# Patient Record
Sex: MALE | Race: WHITE
[De-identification: names, ages, dates, MRNs, and addresses within clinical notes are randomized per-mention and may not be internally consistent; named-entity substitution may affect disease eponyms.]

---

## 2018-04-03 ENCOUNTER — HOSPITAL ENCOUNTER (EMERGENCY)
Dept: HOSPITAL 75 - ER | Age: 48
Discharge: HOME | End: 2018-04-03
Payer: COMMERCIAL

## 2018-04-03 VITALS — DIASTOLIC BLOOD PRESSURE: 100 MMHG | SYSTOLIC BLOOD PRESSURE: 162 MMHG

## 2018-04-03 VITALS — BODY MASS INDEX: 36.57 KG/M2 | HEIGHT: 72 IN | WEIGHT: 270 LBS

## 2018-04-03 DIAGNOSIS — Z88.0: ICD-10-CM

## 2018-04-03 DIAGNOSIS — I88.0: Primary | ICD-10-CM

## 2018-04-03 DIAGNOSIS — F17.200: ICD-10-CM

## 2018-04-03 LAB
ALBUMIN SERPL-MCNC: 4.3 GM/DL (ref 3.2–4.5)
ALP SERPL-CCNC: 68 U/L (ref 40–136)
ALT SERPL-CCNC: 44 U/L (ref 0–55)
APTT PPP: YELLOW S
BACTERIA #/AREA URNS HPF: NEGATIVE /HPF
BASOPHILS # BLD AUTO: 0 10^3/UL (ref 0–0.1)
BASOPHILS NFR BLD AUTO: 0 % (ref 0–10)
BILIRUB SERPL-MCNC: 0.7 MG/DL (ref 0.1–1)
BILIRUB UR QL STRIP: NEGATIVE
BUN/CREAT SERPL: 12
CALCIUM SERPL-MCNC: 9.9 MG/DL (ref 8.5–10.1)
CHLORIDE SERPL-SCNC: 102 MMOL/L (ref 98–107)
CO2 SERPL-SCNC: 20 MMOL/L (ref 21–32)
CREAT SERPL-MCNC: 1.05 MG/DL (ref 0.6–1.3)
EOSINOPHIL # BLD AUTO: 0.7 10^3/UL (ref 0–0.3)
EOSINOPHIL NFR BLD AUTO: 8 % (ref 0–10)
ERYTHROCYTE [DISTWIDTH] IN BLOOD BY AUTOMATED COUNT: 13.1 % (ref 10–14.5)
FIBRINOGEN PPP-MCNC: CLEAR MG/DL
GFR SERPLBLD BASED ON 1.73 SQ M-ARVRAT: > 60 ML/MIN
GLUCOSE SERPL-MCNC: 306 MG/DL (ref 70–105)
GLUCOSE UR STRIP-MCNC: (no result) MG/DL
GRAN CASTS #/AREA URNS LPF: (no result) /LPF
HCT VFR BLD CALC: 45 % (ref 40–54)
HGB BLD-MCNC: 16.3 G/DL (ref 13.3–17.7)
KETONES UR QL STRIP: NEGATIVE
LEUKOCYTE ESTERASE UR QL STRIP: NEGATIVE
LYMPHOCYTES # BLD AUTO: 2 X 10^3 (ref 1–4)
LYMPHOCYTES NFR BLD AUTO: 22 % (ref 12–44)
MANUAL DIFFERENTIAL PERFORMED BLD QL: NO
MCH RBC QN AUTO: 31 PG (ref 25–34)
MCHC RBC AUTO-ENTMCNC: 37 G/DL (ref 32–36)
MCV RBC AUTO: 83 FL (ref 80–99)
MONOCYTES # BLD AUTO: 0.5 X 10^3 (ref 0–1)
MONOCYTES NFR BLD AUTO: 5 % (ref 0–12)
NEUTROPHILS # BLD AUTO: 5.7 X 10^3 (ref 1.8–7.8)
NEUTROPHILS NFR BLD AUTO: 64 % (ref 42–75)
NITRITE UR QL STRIP: NEGATIVE
PH UR STRIP: 5 [PH] (ref 5–9)
PLATELET # BLD: 244 10^3/UL (ref 130–400)
PMV BLD AUTO: 9.5 FL (ref 7.4–10.4)
POTASSIUM SERPL-SCNC: 4.4 MMOL/L (ref 3.6–5)
PROT SERPL-MCNC: 7.8 GM/DL (ref 6.4–8.2)
PROT UR QL STRIP: (no result)
RBC # BLD AUTO: 5.35 10^6/UL (ref 4.35–5.85)
RBC #/AREA URNS HPF: (no result) /HPF
SODIUM SERPL-SCNC: 135 MMOL/L (ref 135–145)
SP GR UR STRIP: 1.02 (ref 1.02–1.02)
SQUAMOUS #/AREA URNS HPF: (no result) /HPF
UROBILINOGEN UR-MCNC: NORMAL MG/DL
WBC # BLD AUTO: 8.9 10^3/UL (ref 4.3–11)
WBC #/AREA URNS HPF: (no result) /HPF

## 2018-04-03 PROCEDURE — 36415 COLL VENOUS BLD VENIPUNCTURE: CPT

## 2018-04-03 PROCEDURE — 85025 COMPLETE CBC W/AUTO DIFF WBC: CPT

## 2018-04-03 PROCEDURE — 81000 URINALYSIS NONAUTO W/SCOPE: CPT

## 2018-04-03 PROCEDURE — 96374 THER/PROPH/DIAG INJ IV PUSH: CPT

## 2018-04-03 PROCEDURE — 96361 HYDRATE IV INFUSION ADD-ON: CPT

## 2018-04-03 PROCEDURE — 74177 CT ABD & PELVIS W/CONTRAST: CPT

## 2018-04-03 PROCEDURE — 80053 COMPREHEN METABOLIC PANEL: CPT

## 2018-04-03 PROCEDURE — 86141 C-REACTIVE PROTEIN HS: CPT

## 2018-04-03 NOTE — XMS REPORT
Continuity of Care Document

 Created on: 2018



NIGEL LAMBERT

External Reference #: [id number]

: 1970

Sex: Male



Demographics







 Preferred Language  Unknown

 

 Marital Status  Unknown

 

 Mor[employer] Affiliation  Unknown

 

 Race  Unknown

 

 Ethnic Group  Unknown





Author







 Author  [facility]

 

 Address  Unknown

 

 Phone  Unavailable



              



Allergies

      





 Active            Description            Code            Type            
Severity            Reaction            Onset            Reported/Identified   
         Relationship to Patient            Clinical Status        

 

 Yes            Penicillins                         Drug Allergy            N/A
            N/A                         2011                             
     



                  



Medications

      



There is no data.                  



Problems

      





 Date Dx Coded            Attending            Type            Code            
Diagnosis            Diagnosed By        

 

 2011                                      465.9            UPPER 
RESPIRATORY INFECTION                     

 

 2013                                      786.2            COUGH        
             



                    



Procedures

      



There is no data.                  



Results

      



There is no data.              



Encounters

      





 ACCT No.            Visit Date/Time            Discharge            Status    
        Pt. Type            Provider            Facility            Loc./Unit  
          Complaint        

 

 [id number]            2013 15:45:00                                      
Document Registration

## 2018-04-03 NOTE — XMS REPORT
[facility]

 Created on: 10/25/2016



Reuben Parekh

External Reference #: [id number]

: 1970

Sex: Male



Demographics







 Address  [address] [place]  [address]

 

 Home Phone  [phone]

 

 Preferred Language  Unknown

 

 Marital Status  Unknown

 

 Jehovah's witness Affiliation  Unknown

 

 Race  White

 

 Ethnic Group  Not  or 





Author







 YUMIKO Levine

 

 Organization  [employer]

 

 Address  Unknown

 

 Phone  Unavailable







Care Team Providers







 Care Team Member Name  Role  Phone

 

 YUMIKO CONRAD  CP  Unavailable



                                                                



Allergies, Adverse Reactions, Alerts

          





 Substance  Reaction  Event Type

 

 Penicillin G Sodium  anaphylaxis  Drug Allergy



                                                                               
         



Problems

          





 Problem Type  Condition  Code  Onset Dates  Condition Status

 

 Problem  Cough  786.2     Active

 

 Assessment  Essential hypertension  I10     Active

 

 Problem  Essential hypertension  I10     Active



                                                                               
                             



Medications

          





 Medication  Code System  Code  Instructions  Start Date  End Date  Status  
Dosage

 

 Clonidine HCl  NDC  [address]-10  0.2 MG Orally twice daily  2016   
     1 tablet

 

 Azithromycin  NDC  [address]-91  250 MG Orally Once a day       2 tablets  on the first day, then 1 tablet daily for 4 days

 

 Lisinopril-Hydrochlorothiazide  NDC  [address]-32  20-12.5 MG Orally Once a 
day  2016        1 tablet



                                                                               
                             



Procedures

          





 Procedure  Coding System  Code  Date

 

 Office Visit, Est Pt., Level 4  CPT-4  47516  2016



                                                                               
                   



Vital Signs

          





 Date/Time:  2016

 

 Cardiac Monitoring Heart Rate  76 bpm

 

 Weight  286.4 lbs

 

 Height  72 in

 

 BMI  38.84 Index

 

 Blood Pressure Diastolic  118 mmHg

 

 Blood Pressure Systolic  170 mmHg



                                                                              



Results

          No Known Results                                                     
               



Summary Purpose

          [employer] Submission

## 2018-04-03 NOTE — XMS REPORT
[facility]

 Created on: 2016



Reuben Parekh

External Reference #: [id number]

: 1970

Sex: Male



Demographics







 Address  [address] [place]  [address]

 

 Home Phone  [phone]

 

 Preferred Language  Unknown

 

 Marital Status  Unknown

 

 Church Affiliation  Unknown

 

 Race  White

 

 Ethnic Group  Not  or 





Author







 Author  BRUCE TRACY

 

 Organization  [employer]

 

 Address  Unknown

 

 Phone  Unavailable







Care Team Providers







 Care Team Member Name  Role  Phone

 

 BRUCE TRACY  CP  Unavailable



                                                                



Allergies

          No Known Allergies                                                   
                                     



Problems

          





 Problem Type  Condition  Code  Onset Dates  Condition Status

 

 Problem  Essential hypertension  I10     Active

 

 Problem  Cough  786.2     Active

 

 Problem  Hypertension, benign  I10     Active



                                                                               
                             



Medications

          





 Medication  Code System  Code  Instructions  Start Date  End Date  Status  
Dosage

 

 Clonidine HCl  NDC  [address]-10  0.2 MG Orally twice daily  2016   
     1 tablet

 

 Lisinopril-Hydrochlorothiazide  NDC  [address]-60  20-25 MG Orally Once a day
  2016        1 tablet



                                                                               
         



Results

          No Known Results                                                     
               



Summary Purpose

          [employer] Submission

## 2018-04-03 NOTE — XMS REPORT
[facility]

 Created on: 10/12/2016



Sun ValleyReuben

External Reference #: [id number]

: 1970

Sex: Male



Demographics







 Address  [address] [place]  [address]

 

 Home Phone  [phone]

 

 Preferred Language  Unknown

 

 Marital Status  Unknown

 

 Catholic Affiliation  Unknown

 

 Race  White

 

 Ethnic Group  Not  or 





Author







 Author  BRUCE TRACY

 

 Organization  [employer]

 

 Address  Unknown

 

 Phone  Unavailable







Care Team Providers







 Care Team Member Name  Role  Phone

 

 BRUCE TRACY  CP  Unavailable



                                                                



Allergies, Adverse Reactions, Alerts

          





 Substance  Reaction  Event Type

 

 Penicillin V Potassium  Info Not Available  Drug Allergy



                                                                               
         



Problems

          





 Problem Type  Condition  Code  Onset Dates  Condition Status

 

 Problem  Cough  786.2     Active

 

 Assessment  Hypertension, benign  I10     Active

 

 Problem  Hypertension, benign  I10     Active



                                                                               
                             



Medications

          





 Medication  Code System  Code  Instructions  Start Date  End Date  Status  
Dosage

 

 Lisinopril-Hydrochlorothiazide  NDC  [address]-11  20-25 MG Orally Once a day
  2016        1 tablet

 

 Clonidine HCl  NDC  [address]-10  0.2 MG Orally twice daily  2016   
     1 tablet



                                                                               
                   



Procedures

          





 Procedure  Coding System  Code  Date

 

 Office Visit, Est Pt., Level 3  CPT-4  86791  Oct 06, 2016



                                                                               
                   



Vital Signs

          





 Date/Time:  Oct 06, 2016

 

 Cardiac Monitoring Heart Rate  82 bpm

 

 Weight  282.6 lbs

 

 Height  72 in

 

 BMI  38.32 Index

 

 Blood Pressure Diastolic  106 mmHg

 

 Blood Pressure Systolic  160 mmHg



                                                                              



Results

          No Known Results                                                     
               



Summary Purpose

          [employer] Submission

## 2018-04-03 NOTE — DIAGNOSTIC IMAGING REPORT
PROCEDURE: CT abdomen and pelvis with contrast.



TECHNIQUE: Multiple contiguous axial images were obtained through

the abdomen and pelvis after administration of intravenous

contrast. 



INDICATION: Pain.



COMPARISON: 04/27/2011.



FINDINGS: There is a calcified granuloma at the left lung base.

The lung bases are otherwise clear. There is fairly prominent

diffuse hepatic steatosis. No focal hepatic mass is seen. The

portal vein enhances normally. The gallbladder is absent. There

is no biliary dilatation. The pancreas appears unremarkable.

There are scattered calcified granulomas in the spleen which is

otherwise unremarkable. The adrenal glands appear normal. The

kidneys, ureters and bladder appear unremarkable. No obstructive

change is seen. The appendix appears normal. There is no focal

inflammatory process or evidence of bowel obstruction. There is

some hazy stranding with mild prominent lymph nodes seen in the

central mesentery possibly related to mesenteric panniculitis. No

abnormally enlarged lymph nodes are seen. There is no free fluid

or free air. There is a small fat-containing umbilical hernia.

The abdominal aorta appears normal in caliber with mild

atherosclerosis noted. There are mild degenerative changes in the

spine.



IMPRESSION:

1. There is mild hazy soft tissue stranding in the central

mesentery with a few prominent lymph nodes through this region.

This can be seen with mesenteric panniculitis.

2. Diffuse hepatic steatosis.

3. Small fat-containing umbilical hernia.

4. No acute abnormality is suspected in the abdomen and pelvis.



Dictated by: 



  Dictated on workstation # [id number]

## 2018-04-03 NOTE — ED ABDOMINAL PAIN
General


Chief Complaint:  Abdominal/GI Problems


Stated Complaint:  BACK PAIN RIGHT SIDE PAIN


Nursing Triage Note:  


c/o right lower abd pain x 1.5 weeks. Pain increased to right flank this 


morning. Denies urinary symptoms/fever/chills.


Sepsis Screen:  No Definite Risk


Source of Information:  Patient


Exam Limitations:  No Limitations





History of Present Illness


Date Seen by Provider:  Apr 3, 2018


Time Seen by Provider:  09:30


Initial Comments


Here with report of right flank pain over the last week and a half.  Started in 

the right lower quadrant and has moved to the right flank.  Denies diarrhea or 

dysuria.  Has had some vomiting but no blood in the vomit, stool or urine.  

Denies fever and chills.  Denies weakness.  Blood pressure is elevated and has 

history of elevated blood pressure.


Timing/Duration:  1 Week


Severity/Quality:  Moderate


Location:  RLQ


Radiation:  Flank


Activities at Onset:  None


Modifying Factors:  Worsens With Movement, Improves With Resting


Associated Symptoms:  Back Pain, No Chest Pain, No Fever/Chills, No Nausea/

Vomiting, No Shortness of Air, No Weakness





Allergies and Home Medications


Allergies


Coded Allergies:  


     Penicillins (Unverified  Allergy, Mild, 2/3/10)





Home Medications


Lisinopril/Hydrochlorothiazide 1 Each Tablet, 1 EACH PO DAILY, (Reported)





Patient Home Medication List


Home Medication List Reviewed:  Yes





Review of Systems


Constitutional:  see HPI, No chills, No fever


EENTM:  No Symptoms Reported


Respiratory:  No Symptoms Reported


Cardiovascular:  No Symptoms Reported


Gastrointestinal:  See HPI, Abdominal Pain, Denies Nausea, Denies Vomiting


Genitourinary:  No Symptoms Reported


Musculoskeletal:  back pain, No neck pain


Skin:  no symptoms reported


Psychiatric/Neurological:  No Symptoms Reported





All Other Systems Reviewed


Negative Unless Noted:  Yes





Past Medical-Social-Family Hx


Patient Social History


Alcohol Use:  Occasionally Uses


Alcohol Beverage of Choice:  Beer


Recreational Drug Use:  No


Smoking Status:  Current Everyday Smoker


Recent Foreign Travel:  No


Contact w/Someone Who Travel:  No


Recent Infectious Disease Expo:  No


Recent Hopitalizations:  No





Surgeries


History of Surgeries:  Yes (gallbladder, knee )





Respiratory


History of Respiratory Disorde:  No





Cardiovascular


History of Cardiac Disorders:  Yes





Neurological


History of Neurological Disord:  Yes





Gastrointestinal


History of Gastrointestinal Di:  No





Endocrine


History of Endocrine Disorders:  No





Psychosocial


History of Psychiatric Problem:  No





Blood Transfusions


History of Blood Disorders:  No





Reviewed Nursing Assessment


Reviewed/Agree w Nursing PMH:  Yes





Family Medical History


Significant Family History:  No Pertinent Family Hx





Physical Exam


Vital Signs





 VS - Last 72 Hours, by Label








 4/3/18





 08:54


 


Temp 97.4


 


Pulse 101


 


Resp 20


 


B/P (MAP) 172/139 (150)


 


Pulse Ox 98





Capillary Refill : Less Than 3 Seconds


General Appearance:  WD/WN, no apparent distress


HEENT:  PERRL/EOMI, pharynx normal


Neck:  full range of motion, supple


Respiratory:  lungs clear, normal breath sounds


Cardiovascular:  regular rate, rhythm, no murmur


Gastrointestinal:  normal bowel sounds, non tender, soft, no organomegaly, no 

pulsatile mass


Extremities:  non-tender, normal inspection


Back:  no vertebral tenderness, CVA tenderness (R), No CVA tenderness (L)


Neurologic/Psychiatric:  alert, oriented x 3


Skin:  normal color, warm/dry





Progress/Results/Core Measures


Results/Orders


Lab Results





Laboratory Tests








Test


  4/3/18


09:00 4/3/18


09:30 Range/Units


 


 


White Blood Count


  8.9 


  


  4.3-11.0


10^3/uL


 


Red Blood Count


  5.35 


  


  4.35-5.85


10^6/uL


 


Hemoglobin 16.3   13.3-17.7  G/DL


 


Hematocrit 45   40-54  %


 


Mean Corpuscular Volume 83   80-99  FL


 


Mean Corpuscular Hemoglobin 31   25-34  PG


 


Mean Corpuscular Hemoglobin


Concent 37 H


  


  32-36  G/DL


 


 


Red Cell Distribution Width 13.1   10.0-14.5  %


 


Platelet Count


  244 


  


  130-400


10^3/uL


 


Mean Platelet Volume 9.5   7.4-10.4  FL


 


Neutrophils (%) (Auto) 64   42-75  %


 


Lymphocytes (%) (Auto) 22   12-44  %


 


Monocytes (%) (Auto) 5   0-12  %


 


Eosinophils (%) (Auto) 8   0-10  %


 


Basophils (%) (Auto) 0   0-10  %


 


Neutrophils # (Auto) 5.7   1.8-7.8  X 10^3


 


Lymphocytes # (Auto) 2.0   1.0-4.0  X 10^3


 


Monocytes # (Auto) 0.5   0.0-1.0  X 10^3


 


Eosinophils # (Auto)


  0.7 H


  


  0.0-0.3


10^3/uL


 


Basophils # (Auto)


  0.0 


  


  0.0-0.1


10^3/uL


 


Sodium Level 135   135-145  MMOL/L


 


Potassium Level 4.4   3.6-5.0  MMOL/L


 


Chloride Level 102     MMOL/L


 


Carbon Dioxide Level 20 L  21-32  MMOL/L


 


Anion Gap 13   5-14  MMOL/L


 


Blood Urea Nitrogen 13   7-18  MG/DL


 


Creatinine


  1.05 


  


  0.60-1.30


MG/DL


 


Estimat Glomerular Filtration


Rate > 60 


  


   


 


 


BUN/Creatinine Ratio 12    


 


Glucose Level 306 H    MG/DL


 


Calcium Level 9.9   8.5-10.1  MG/DL


 


Total Bilirubin 0.7   0.1-1.0  MG/DL


 


Aspartate Amino Transf


(AST/SGOT) 21 


  


  5-34  U/L


 


 


Alanine Aminotransferase


(ALT/SGPT) 44 


  


  0-55  U/L


 


 


Alkaline Phosphatase 68     U/L


 


C-Reactive Protein High


Sensitivity 1.28 H


  


  0.00-0.50


MG/DL


 


Total Protein 7.8   6.4-8.2  GM/DL


 


Albumin 4.3   3.2-4.5  GM/DL


 


Urine Color  YELLOW   


 


Urine Clarity  CLEAR   


 


Urine pH  5  5-9  


 


Urine Specific Gravity  1.020  1.016-1.022  


 


Urine Protein  1+ H NEGATIVE  


 


Urine Glucose (UA)  4+ H NEGATIVE  


 


Urine Ketones  NEGATIVE  NEGATIVE  


 


Urine Nitrite  NEGATIVE  NEGATIVE  


 


Urine Bilirubin  NEGATIVE  NEGATIVE  


 


Urine Urobilinogen  NORMAL  NORMAL  MG/DL


 


Urine Leukocyte Esterase  NEGATIVE  NEGATIVE  


 


Urine RBC (Auto)  1+ H NEGATIVE  


 


Urine RBC  RARE   /HPF


 


Urine WBC  NONE   /HPF


 


Urine Squamous Epithelial


Cells 


  RARE 


   /HPF


 


 


Urine Crystals  NONE   /LPF


 


Urine Bacteria  NEGATIVE   /HPF


 


Urine Casts  PRESENT   /LPF


 


Urine Granular Casts  RARE   /LPF


 


Urine Mucus  NEGATIVE   /LPF


 


Urine Culture Indicated  NO   








My Orders





Orders - BENEDICTO REBOLLEDO MD


Cbc With Automated Diff (4/3/18 08:51)


Comprehensive Metabolic Panel (4/3/18 08:51)


Hs C Reactive Protein (4/3/18 08:51)


Ua Culture If Indicated (4/3/18 08:51)


Saline Lock/Iv-Start (4/3/18 08:51)


Ns Iv 1000 Ml (Sodium Chloride 0.9%) (4/3/18 08:51)


Ct Abdomen/Pelvis W (4/3/18 09:58)


Iohexol Injection (Omnipaque 350 Mg/Ml 1 (4/3/18 10:15)


Ns (Ivpb) (Sodium Chloride 0.9%) (4/3/18 10:15)


Metoprolol Succinate (Xl) Tab (Toprol Xl (4/3/18 11:45)





Medications Given in ED





Current Medications








 Medications  Dose


 Ordered  Sig/Jimmie


 Route  Start Time


 Stop Time Status Last Admin


Dose Admin


 


 Iohexol  100 ml  ONCE  ONCE


 IV  4/3/18 10:15


 4/3/18 10:16 DC 4/3/18 10:29


100 ML


 


 Sodium Chloride  250 ml  ONCE  ONCE


 IV  4/3/18 10:15


 4/3/18 10:16 DC 4/3/18 10:29


80 ML


 


 Sodium Chloride  1,000 ml @ 


 0 mls/hr  Q0M ONCE


 IV  4/3/18 08:51


 4/3/18 08:53 DC 4/3/18 09:42


1,000 MLS/HR








Vital Signs/I&O





Vital Sign - Last 12Hours








 4/3/18





 08:54


 


Temp 97.4


 


Pulse 101


 


Resp 20


 


B/P (MAP) 172/139 (150)


 


Pulse Ox 98














Blood Pressure Mean:  150








Progress Note :  


Progress Note


Seen and evaluated.  IV, labs, UA, normal saline 1 L bolus ordered.  CT abdomen 

and pelvis ordered with contrast.  Patient declined pain medicine.  Monitor 

patient.  1130: Labs and CT reviewed.  Patient appears to have diabetes with 

blood sugar just over 300.  Toprol XL 50 mg by mouth given for hypertension.  

Patient has asked about referral to local physician as he typically follows in 

the clinic but would like to have a more stable doctor.  I will attempt to 

contact Dr. Maninder Conrad about options for his clinic.  He understands 

importance of following up for further evaluation and initiation of treatment 

for his diabetes.  Discharged home with return precautions.  Patient and family 

verbalize understanding instructions and agreement with plan.  1140: I 

discussed the case with Dr. Conrad and he will see the patient in the clinic 

tomorrow at 4 p.m.  He will initiate prescriptions as needed.  Toradol 30 mg IV 

for the low back pain given now.  Discharged home with return precautions.  

Patient and family verbalize understanding instructions and agreement with plan.





Diagnostic Imaging





   Diagonstic Imaging:  CT


   Plain Films/CT/US/NM/MRI:  abdomen, pelvis


Comments


 [facility].


 [place], [place]





NAME:   NIGEL LAMBERT


MED REC#:   [id number]


ACCOUNT#:   [id number]


PT STATUS:   REG ER


:   1970


PHYSICIAN:   BENEDICTO REBOLLEDO MD


ADMIT DATE:   18/ER


 ***Draft***


Date of Exam:18





CT ABDOMEN/PELVIS W








PROCEDURE: CT abdomen and pelvis with contrast.





TECHNIQUE: Multiple contiguous axial images were obtained through


the abdomen and pelvis after administration of intravenous


contrast. 





INDICATION: Pain.





COMPARISON: 2011.





FINDINGS: There is a calcified granuloma at the left lung base.


The lung bases are otherwise clear. There is fairly prominent


diffuse hepatic steatosis. No focal hepatic mass is seen. The


portal vein enhances normally. The gallbladder is absent. There


is no biliary dilatation. The pancreas appears unremarkable.


There are scattered calcified granulomas in the spleen which is


otherwise unremarkable. The adrenal glands appear normal. The


kidneys, ureters and bladder appear unremarkable. No obstructive


change is seen. The appendix appears normal. There is no focal


inflammatory process or evidence of bowel obstruction. There is


some hazy stranding with mild prominent lymph nodes seen in the


central mesentery possibly related to mesenteric panniculitis. No


abnormally enlarged lymph nodes are seen. There is no free fluid


or free air. There is a small fat-containing umbilical hernia.


The abdominal aorta appears normal in caliber with mild


atherosclerosis noted. There are mild degenerative changes in the


spine.





IMPRESSION:


1. There is mild hazy soft tissue stranding in the central


mesentery with a few prominent lymph nodes through this region.


This can be seen with mesenteric panniculitis.


2. Diffuse hepatic steatosis.


3. Small fat-containing umbilical hernia.


4. No acute abnormality is suspected in the abdomen and pelvis.





  Dictated on workstation # [id number]








Dict:   18 1043


Trans:   18 1052


[facility] [id number]





Interpreted by:     AINSLEY DONIS DO


Electronically signed by:





Departure


Impression


Impression:  


 Primary Impression:  


 Right flank pain


 Additional Impression:  


 Mesenteric adenitis


Disposition:  01 HOME, SELF-CARE


Condition:  Improved





Departure-Patient Inst.


Decision time for Depature:  11:52


Referrals:  


NO,LOCAL PHYSICIAN (PCP)


Primary Care Physician








MANINDER CONRAD MD


Patient Instructions:  Acute Abdomen (Belly Pain), Adult (DC), Low Back Pain  (

DC)





Add. Discharge Instructions:  


All discharge instructions reviewed with patient and/or family. Voiced 

understanding.





You may take ibuprofen 600 mg every 8 hours as needed for pain.  You may take 

Tylenol/acetaminophen 1000 mg every 8 hours as needed for pain.  Drink plenty 

of fluids.  You should avoid sugars in your diet as her blood sugar is 

elevated.  Follow-up with Dr. Maninder Conrad tomorrow at his office at 4 p.m.  

Return for worse pain, fever, vomiting, weakness, breathing problems or other 

concerns as needed.





Copy


Copies To 1:   MANINDER CONRAD MD, TIMOTHY D MD Apr 3, 2018 10:04

## 2018-04-03 NOTE — XMS REPORT
[facility]

 Created on: 2017



Reuben Parekh

External Reference #: [id number]

: 1970

Sex: Male



Demographics







 Address  [address] [place]  [address]

 

 Preferred Language  Unknown

 

 Marital Status  Unknown

 

 Restorationism Affiliation  Unknown

 

 Race  Unknown

 

 Ethnic Group  Unknown





Author







 Author  TRACE COLINDRES

 

 [facility]

 

 Address  [address] [place]  [address]



 

 Phone  [phone]







Care Team Providers







 Care Team Member Name  Role  Phone

 

 EREN COLINDRESISTIN  Unavailable  [phone]







PROBLEMS







 Type  Condition  ICD9-CM Code  ZBH28-DK Code  Onset Dates  Condition Status  
SNOMED Code

 

 Problem  Cough  786.2        Active  18719593

 

 Assessment  Bronchitis     J40  16 Sep, 2016  Active  [id number]

 

 Assessment  Hypertensive crisis     I10  16 Sep, 2016  Active  [id number]

 

 Assessment  Essential hypertension     I10  16 Sep, 2016  Active  [id number]







ALLERGIES







 Substance  Reaction  Event Type  Date  Status

 

 Penicillin G Sodium  anaphylaxis  Drug Allergy  16 Sep, 2016  Active







SOCIAL HISTORY

No smoking Hx information available



PLAN OF CARE





VITAL SIGNS







 Height  72 in  2016

 

 Weight  284.4 lbs  2016

 

 Heart Rate  92 bpm  2016

 

 Respiratory Rate  22   2016

 

 Oximetry  96 %  2016

 

 BMI  38.57 kg/m2  2016

 

 Blood pressure systolic  238 mmHg  2016

 

 Blood pressure diastolic  142 mmHg  2016







MEDICATIONS







 Medication  Instructions  Dosage  Frequency  Start Date  End Date  Duration  
Status

 

 Azithromycin 250 MG  Orally Once a day  2 tablets  on the first day, then 1 
tablet daily for 4 days  24h  16 Sep, 2016  21 Sep, 2016  5 day(s)  Active

 

 Clonidine HCl 0.2 MG  Orally twice daily  1 tablet     16 Sep, 2016     30 day(
s)  Active

 

 NyQuil                    Active

 

 Mucinex D  mg     take 1 tablet by Oral route 2 times per day for 10 day(
s) PRN             Active







RESULTS







 Name  Result  Date  Reference Range

 

 URINE DRUG SCREEN (IN HOUSE)     2016   

 

 Lot #  [id number]      

 

 Exp date        

 

 Control  +      

 

 COCAINE  negative      

 

 AMPH  negative      

 

 MTD  negative      

 

 THC  negative      

 

 OPIATE  Positive      

 

 BENZO  negative      

 

 PCP  negative      

 

 BAR  negative      

 

 OXY  negative      

 

 MAMP  negative      

 

 TCA  negative      

 

 MDMA  negative      

 

 UA LONG DIP (IN HOUSE)     2016   

 

 Lot #  [id number]      

 

 Exp date  2017      

 

 Clarity  clear      

 

 Color  yellow      

 

 Odor  none      

 

 GLU  negative      

 

 ANKITA  negative      

 

 KET  negative      

 

 SG  1.020      

 

 BLO  trace-lysed      

 

 pH  6.0      

 

 Protein  negative      

 

 URO  0.2      

 

 NIT  negative      

 

 EDSON  negative      

 

 Lot #  [id number]      

 

 Exp date  2017      

 

 CBC     2016   

 

 WBC  7.1     3.4-10.8

 

 RBC  6.15     4.14-5.80

 

 Hemoglobin  18.3     12.6-17.7

 

 Hematocrit  52.5     37.5-51.0

 

 MCV  85     79-97

 

 MCH  29.8     26.6-33.0

 

 MCHC  34.9     31.5-35.7

 

 RDW  13.8     12.3-15.4

 

 Platelets  202     150-379

 

 Neutrophils  57      

 

 Lymphs  30      

 

 Monocytes  7      

 

 Eos  6      

 

 Basos  0      

 

 Immature Cells         

 

 Neutrophils (Absolute)  4.0     1.4-7.0

 

 Lymphs (Absolute)  2.2     0.7-3.1

 

 Monocytes(Absolute)  0.5     0.1-0.9

 

 Eos (Absolute)  0.4     0.0-0.4

 

 Baso (Absolute)  0.0     0.0-0.2

 

 Immature Granulocytes  0      

 

 Immature Grans (Abs)  0.0     0.0-0.1

 

 NRBC         

 

 Hematology Comments:         

 

 CMP     2016   

 

 Glucose, Serum  112     65-99

 

 BUN  11     6-24

 

 Creatinine, Serum  1.19     0.76-1.27

 

 eGFR If NonAfricn Am  72         >59

 

 eGFR If Africn Am  83         >59

 

 BUN/Creatinine Ratio  9     9-20

 

 Sodium, Serum  138     134-144

 

 Potassium, Serum  4.9     3.5-5.2

 

 Chloride, Serum  95     

 

 Carbon Dioxide, Total  24     18-29

 

 Calcium, Serum  9.8     8.7-10.2

 

 Protein, Total, Serum  7.9     6.0-8.5

 

 Albumin, Serum  4.5     3.5-5.5

 

 Globulin, Total  3.4     1.5-4.5

 

 A/G Ratio  1.3     1.1-2.5

 

 Bilirubin, Total  0.6     0.0-1.2

 

 Alkaline Phosphatase, S  69     

 

 AST (SGOT)  35     0-40

 

 ALT (SGPT)  47     0-44







PROCEDURES







 Procedure  Date Ordered  Related Diagnosis  Body Site

 

 Office Visit, Est Pt., Level 3  2016      

 

 ELECTROCARDIOGRAM, TRACING  2016      

 

 EKG, TRACING (IN-HOUSE)  2016  N/A   

 

 ALBUTEROL UNIT DOSE FORM INHALED  2016  N/A   

 

 ALBUTEROL INHAL UNIT DOSE 1 MG  2016      

 

 DRUG SCREEN NON TLC DEVICES  2016      

 

 VENIPUNCT, ROUTINE*  2016      

 

 COMPLETE CBC W/AUTO DIFF WBC  2016      

 

 MEASURE BLOOD OXYGEN LEVEL  2016      

 

 URINALYSIS, AUTO, W/O SCOPE  2016      

 

 COMPREHEN METABOLIC PANEL  2016      







IMMUNIZATIONS

No Known Immunizations